# Patient Record
Sex: MALE | Race: BLACK OR AFRICAN AMERICAN | NOT HISPANIC OR LATINO | Employment: PART TIME | URBAN - METROPOLITAN AREA
[De-identification: names, ages, dates, MRNs, and addresses within clinical notes are randomized per-mention and may not be internally consistent; named-entity substitution may affect disease eponyms.]

---

## 2022-06-18 PROCEDURE — 99284 EMERGENCY DEPT VISIT MOD MDM: CPT | Mod: 25,27

## 2022-06-19 ENCOUNTER — HOSPITAL ENCOUNTER (EMERGENCY)
Facility: HOSPITAL | Age: 22
Discharge: HOME | End: 2022-06-19
Attending: FAMILY MEDICINE
Payer: MEDICAID

## 2022-06-19 VITALS
TEMPERATURE: 98 F | BODY MASS INDEX: 39.17 KG/M2 | RESPIRATION RATE: 17 BRPM | DIASTOLIC BLOOD PRESSURE: 73 MMHG | WEIGHT: 315 LBS | HEART RATE: 88 BPM | HEIGHT: 75 IN | OXYGEN SATURATION: 98 % | SYSTOLIC BLOOD PRESSURE: 128 MMHG
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
## 2022-06-19 DIAGNOSIS — T14.90XA TRAUMA: ICD-10-CM
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
## 2022-06-19 PROCEDURE — 25000003 PHARM REV CODE 250: Performed by: PHYSICIAN ASSISTANT

## 2022-06-19 RX ORDER — CYCLOBENZAPRINE HCL 10 MG
10 TABLET ORAL 3 TIMES DAILY PRN
Qty: 15 TABLET | Refills: 0 | Status: SHIPPED | OUTPATIENT
Start: 2022-06-19 | End: 2023-02-08

## 2022-06-19 RX ORDER — IBUPROFEN 400 MG/1
400 TABLET ORAL
Status: COMPLETED | OUTPATIENT
Start: 2022-06-19 | End: 2022-06-19

## 2022-06-19 RX ADMIN — IBUPROFEN 400 MG: 400 TABLET ORAL at 03:06
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
## 2022-06-19 NOTE — Clinical Note
"Rj Tang" Jaciel was seen and treated in our emergency department on 6/18/2022.  He may return to work on 06/22/2022.       If you have any questions or concerns, please don't hesitate to call.      Félix Rowe MD"

## 2022-06-20 NOTE — PROGRESS NOTES
Notified VITO Silva and BEENA Ibarra. Orthopedic referral placed in chart. No other orders received.

## 2022-08-01 ENCOUNTER — LAB VISIT (OUTPATIENT)
Dept: LAB | Facility: HOSPITAL | Age: 22
End: 2022-08-01
Attending: INTERNAL MEDICINE
Payer: MEDICAID

## 2022-08-01 DIAGNOSIS — Z11.3 SCREEN FOR STD (SEXUALLY TRANSMITTED DISEASE): ICD-10-CM

## 2022-08-01 DIAGNOSIS — E55.9 VITAMIN D DEFICIENCY: ICD-10-CM

## 2022-08-01 DIAGNOSIS — E78.5 HYPERLIPIDEMIA, UNSPECIFIED HYPERLIPIDEMIA TYPE: ICD-10-CM

## 2022-08-01 DIAGNOSIS — R73.03 PRE-DIABETES: ICD-10-CM

## 2022-08-01 DIAGNOSIS — E03.9 HYPOTHYROIDISM, UNSPECIFIED TYPE: ICD-10-CM

## 2022-08-01 DIAGNOSIS — D64.9 ANEMIA, UNSPECIFIED TYPE: Primary | ICD-10-CM

## 2022-08-01 LAB
ALBUMIN SERPL-MCNC: 4.1 GM/DL (ref 3.5–5)
ALBUMIN/GLOB SERPL: 1.2 RATIO (ref 1.1–2)
ALP SERPL-CCNC: 80 UNIT/L (ref 40–150)
ALT SERPL-CCNC: 32 UNIT/L (ref 0–55)
AST SERPL-CCNC: 19 UNIT/L (ref 5–34)
BILIRUBIN DIRECT+TOT PNL SERPL-MCNC: 0.7 MG/DL
BUN SERPL-MCNC: 9.1 MG/DL (ref 8.9–20.6)
C TRACH DNA SPEC QL NAA+PROBE: NOT DETECTED
CALCIUM SERPL-MCNC: 9.5 MG/DL (ref 8.4–10.2)
CHLORIDE SERPL-SCNC: 106 MMOL/L (ref 98–107)
CHOLEST SERPL-MCNC: 158 MG/DL
CHOLEST/HDLC SERPL: 6 {RATIO} (ref 0–5)
CO2 SERPL-SCNC: 24 MMOL/L (ref 22–29)
CREAT SERPL-MCNC: 1.05 MG/DL (ref 0.73–1.18)
DEPRECATED CALCIDIOL+CALCIFEROL SERPL-MC: 22.7 NG/ML (ref 30–80)
ERYTHROCYTE [DISTWIDTH] IN BLOOD BY AUTOMATED COUNT: 12.5 % (ref 11.5–17)
EST. AVERAGE GLUCOSE BLD GHB EST-MCNC: 114 MG/DL
GLOBULIN SER-MCNC: 3.3 GM/DL (ref 2.4–3.5)
GLUCOSE SERPL-MCNC: 93 MG/DL (ref 74–100)
HBA1C MFR BLD: 5.6 %
HCT VFR BLD AUTO: 44.9 % (ref 42–52)
HDLC SERPL-MCNC: 26 MG/DL (ref 35–60)
HGB BLD-MCNC: 14.7 GM/DL (ref 14–18)
HIV 1+2 AB+HIV1 P24 AG SERPL QL IA: NONREACTIVE
LDLC SERPL CALC-MCNC: 120 MG/DL (ref 50–140)
MCH RBC QN AUTO: 28.4 PG (ref 27–31)
MCHC RBC AUTO-ENTMCNC: 32.7 MG/DL (ref 33–36)
MCV RBC AUTO: 86.7 FL (ref 80–94)
N GONORRHOEA DNA SPEC QL NAA+PROBE: NOT DETECTED
NRBC BLD AUTO-RTO: 0 %
PLATELET # BLD AUTO: 310 X10(3)/MCL (ref 130–400)
PMV BLD AUTO: 10.4 FL (ref 7.4–10.4)
POTASSIUM SERPL-SCNC: 4.2 MMOL/L (ref 3.5–5.1)
PROT SERPL-MCNC: 7.4 GM/DL (ref 6.4–8.3)
RBC # BLD AUTO: 5.18 X10(6)/MCL (ref 4.7–6.1)
SODIUM SERPL-SCNC: 141 MMOL/L (ref 136–145)
T PALLIDUM AB SER QL: NONREACTIVE
T4 FREE SERPL-MCNC: 0.9 NG/DL (ref 0.7–1.48)
TRIGL SERPL-MCNC: 61 MG/DL (ref 34–140)
TSH SERPL-ACNC: 0.92 UIU/ML (ref 0.35–4.94)
VLDLC SERPL CALC-MCNC: 12 MG/DL
WBC # SPEC AUTO: 5.3 X10(3)/MCL (ref 4.5–11.5)

## 2022-08-01 PROCEDURE — 83036 HEMOGLOBIN GLYCOSYLATED A1C: CPT

## 2022-08-01 PROCEDURE — 85027 COMPLETE CBC AUTOMATED: CPT

## 2022-08-01 PROCEDURE — 82306 VITAMIN D 25 HYDROXY: CPT

## 2022-08-01 PROCEDURE — 84439 ASSAY OF FREE THYROXINE: CPT

## 2022-08-01 PROCEDURE — 80053 COMPREHEN METABOLIC PANEL: CPT

## 2022-08-01 PROCEDURE — 84443 ASSAY THYROID STIM HORMONE: CPT

## 2022-08-01 PROCEDURE — 80074 ACUTE HEPATITIS PANEL: CPT

## 2022-08-01 PROCEDURE — 86780 TREPONEMA PALLIDUM: CPT

## 2022-08-01 PROCEDURE — 87591 N.GONORRHOEAE DNA AMP PROB: CPT

## 2022-08-01 PROCEDURE — 80061 LIPID PANEL: CPT

## 2022-08-01 PROCEDURE — 36415 COLL VENOUS BLD VENIPUNCTURE: CPT

## 2022-08-01 PROCEDURE — 87389 HIV-1 AG W/HIV-1&-2 AB AG IA: CPT

## 2022-08-01 PROCEDURE — 87491 CHLMYD TRACH DNA AMP PROBE: CPT

## 2022-08-02 LAB
HAV IGM SERPL QL IA: NONREACTIVE
HBV CORE IGM SERPL QL IA: NONREACTIVE
HBV SURFACE AG SERPL QL IA: NONREACTIVE
HCV AB SERPL QL IA: NONREACTIVE

## 2022-08-03 LAB — PATH REV: NORMAL

## 2022-08-22 ENCOUNTER — OFFICE VISIT (OUTPATIENT)
Dept: URGENT CARE | Facility: CLINIC | Age: 22
End: 2022-08-22
Payer: MEDICAID

## 2022-08-22 VITALS
RESPIRATION RATE: 18 BRPM | BODY MASS INDEX: 38.36 KG/M2 | HEIGHT: 76 IN | HEART RATE: 81 BPM | OXYGEN SATURATION: 96 % | TEMPERATURE: 98 F | SYSTOLIC BLOOD PRESSURE: 142 MMHG | DIASTOLIC BLOOD PRESSURE: 82 MMHG | WEIGHT: 315 LBS

## 2022-08-22 DIAGNOSIS — Z11.52 ENCOUNTER FOR SCREENING FOR COVID-19: Primary | ICD-10-CM

## 2022-08-22 DIAGNOSIS — Z20.822 ENCOUNTER FOR LABORATORY TESTING FOR COVID-19 VIRUS: ICD-10-CM

## 2022-08-22 LAB — SARS-COV-2 RNA RESP QL NAA+PROBE: DETECTED

## 2022-08-22 PROCEDURE — 99214 OFFICE O/P EST MOD 30 MIN: CPT | Mod: PBBFAC | Performed by: FAMILY MEDICINE

## 2022-08-22 PROCEDURE — 99203 OFFICE O/P NEW LOW 30 MIN: CPT | Mod: S$PBB,,, | Performed by: FAMILY MEDICINE

## 2022-08-22 PROCEDURE — 99203 PR OFFICE/OUTPT VISIT, NEW, LEVL III, 30-44 MIN: ICD-10-PCS | Mod: S$PBB,,, | Performed by: FAMILY MEDICINE

## 2022-08-22 PROCEDURE — 87635 SARS-COV-2 COVID-19 AMP PRB: CPT | Performed by: FAMILY MEDICINE

## 2022-08-22 NOTE — LETTER
August 22, 2022      [facility]  [address] [address]  Phone: [phone]       Patient: Rj Grissom   YOB: 2000  Date of Visit: 08/22/2022    To Whom It May Concern:    Eran Grissom  was at [facility] on 08/22/2022. The patient may not return to work/school until results received  If you have any questions or concerns, or if I can be of further assistance, please do not hesitate to contact me.    Sincerely,    TIAGO HYDE MD

## 2022-08-23 ENCOUNTER — TELEPHONE (OUTPATIENT)
Dept: URGENT CARE | Facility: CLINIC | Age: 22
End: 2022-08-23
Payer: MEDICAID

## 2022-08-23 DIAGNOSIS — U07.1 COVID-19 VIRUS DETECTED: ICD-10-CM

## 2022-08-23 NOTE — PROGRESS NOTES
"Subjective:       Patient ID: Najwacoty Grissom is a 21 y.o. male.    Vitals:  height is 6' 3.59" (1.92 m) and weight is 143.7 kg (316 lb 12.8 oz) (abnormal). His temperature is 98.2 °F (36.8 °C). His blood pressure is 142/82 (abnormal) and his pulse is 81. His respiration is 18 and oxygen saturation is 96%.     Chief Complaint: COVID-19 Concerns (exposure)    HPI   Potential COVID exposure.  Asymptomatic  ROS    Constitutional: negative except as stated in HPI  Eye: negative except as stated in HPI  ENT: negative except as stated in HPI  Respiratory: negative except as stated in HPI  Cardiovascular: negative except as stated in HPI  Gastrointestinal: negative except as stated in HPI  Genitourinary: negative except as stated in HPI  Objective:      Physical Exam    VITAL SIGNS: Reviewed.  GENERAL: In no apparent distress  HEAD: No signs of head trauma.  EYES: Pupils are reactive. Extraocular motions intact.  CHEST: Clear breath sounds bilaterally.  CARDIAC: Regular.  Assessment:       1. Encounter for screening for COVID-19    2. Encounter for laboratory testing for COVID-19 virus          Plan:         Encounter for screening for COVID-19    Encounter for laboratory testing for COVID-19 virus  -     COVID-19 Routine Screening         Will notify with Covid test result. You may also see results on your patient portal. If result is negative, consider retesting in 5-7 days or get retested immediately if symptoms develop. Please follow Covid 19 precautions. Please read patient education material          "

## 2022-08-23 NOTE — TELEPHONE ENCOUNTER
----- Message from August Barney MD sent at 8/23/2022  8:42 AM CDT -----  Please notify patient of positive COVID test.  Review COVID-19 instructions

## 2022-08-29 ENCOUNTER — OFFICE VISIT (OUTPATIENT)
Dept: URGENT CARE | Facility: CLINIC | Age: 22
End: 2022-08-29
Payer: MEDICAID

## 2022-08-29 VITALS
WEIGHT: 315 LBS | SYSTOLIC BLOOD PRESSURE: 141 MMHG | HEIGHT: 74 IN | TEMPERATURE: 98 F | OXYGEN SATURATION: 97 % | RESPIRATION RATE: 20 BRPM | DIASTOLIC BLOOD PRESSURE: 81 MMHG | BODY MASS INDEX: 40.43 KG/M2 | HEART RATE: 73 BPM

## 2022-08-29 DIAGNOSIS — Z11.52 ENCOUNTER FOR SCREENING FOR COVID-19: Primary | ICD-10-CM

## 2022-08-29 LAB — SARS-COV-2 RNA RESP QL NAA+PROBE: NOT DETECTED

## 2022-08-29 PROCEDURE — 87635 SARS-COV-2 COVID-19 AMP PRB: CPT | Performed by: NURSE PRACTITIONER

## 2022-08-29 PROCEDURE — 99213 PR OFFICE/OUTPT VISIT, EST, LEVL III, 20-29 MIN: ICD-10-PCS | Mod: S$PBB,,, | Performed by: NURSE PRACTITIONER

## 2022-08-29 PROCEDURE — 99213 OFFICE O/P EST LOW 20 MIN: CPT | Mod: S$PBB,,, | Performed by: NURSE PRACTITIONER

## 2022-08-29 PROCEDURE — 99213 OFFICE O/P EST LOW 20 MIN: CPT | Mod: PBBFAC | Performed by: NURSE PRACTITIONER

## 2022-08-30 NOTE — PROGRESS NOTES
"Subjective:       Patient ID: Najwacoty Grissom is a 21 y.o. male.    Vitals:  height is 6' 2" (1.88 m) and weight is 144.7 kg (319 lb 1.6 oz) (abnormal). His temperature is 97.9 °F (36.6 °C). His blood pressure is 141/81 (abnormal) and his pulse is 73. His respiration is 20 and oxygen saturation is 97%.     Chief Complaint: COVID-19 Concerns (Retest )    Patient is a 21-year-old male, here today for COVID retest after testing positive on 08/22/2022.  Patient states he feels well, denies any symptoms today.      Constitution: Negative.   HENT: Negative.     Neck: neck negative.   Cardiovascular: Negative.    Respiratory: Negative.       Objective:      Physical Exam   Constitutional: He is oriented to person, place, and time. He appears well-developed.   HENT:   Head: Normocephalic.   Ears:   Right Ear: Tympanic membrane normal.   Left Ear: Tympanic membrane normal.   Nose: Nose normal.   Eyes: Conjunctivae and EOM are normal. Pupils are equal, round, and reactive to light.   Neck: Neck supple.   Cardiovascular: Normal rate, regular rhythm and normal heart sounds.   Pulmonary/Chest: Effort normal and breath sounds normal.   Musculoskeletal: Normal range of motion.         General: Normal range of motion.   Neurological: He is alert and oriented to person, place, and time.   Skin: Skin is warm and dry.   Psychiatric: His behavior is normal.   Vitals reviewed.      Assessment:       1. Encounter for screening for COVID-19              No visits with results within 1 Day(s) from this visit.   Latest known visit with results is:   Office Visit on 08/22/2022   Component Date Value Ref Range Status    SARS-CoV-2 PCR 08/22/2022 Detected (A)  Not Detected Final        No results found.   Plan:         COVID PCR pending, will notify of abnormal results.  If you have symptoms, recommend home quarantine for 5 days until improvement in symptoms and fever free for 24 hours.  If any difficulty breathing, increased wheezing, " shortness of breath or any new symptoms and immediately go to ER.      Encounter for screening for COVID-19  -     COVID-19 Routine Screening

## 2022-09-19 ENCOUNTER — HOSPITAL ENCOUNTER (EMERGENCY)
Facility: HOSPITAL | Age: 22
Discharge: HOME | End: 2022-09-20
Attending: FAMILY MEDICINE
Payer: MEDICAID

## 2022-09-19 VITALS
WEIGHT: 315 LBS | BODY MASS INDEX: 40.43 KG/M2 | DIASTOLIC BLOOD PRESSURE: 82 MMHG | HEIGHT: 74 IN | SYSTOLIC BLOOD PRESSURE: 130 MMHG | HEART RATE: 113 BPM | OXYGEN SATURATION: 96 % | TEMPERATURE: 99 F | RESPIRATION RATE: 18 BRPM

## 2022-09-19 DIAGNOSIS — R19.7 DIARRHEA, UNSPECIFIED TYPE: Primary | ICD-10-CM

## 2022-09-19 DIAGNOSIS — M54.50 ACUTE BILATERAL LOW BACK PAIN WITHOUT SCIATICA: ICD-10-CM

## 2022-09-19 PROCEDURE — 99284 EMERGENCY DEPT VISIT MOD MDM: CPT

## 2022-09-19 NOTE — Clinical Note
"Rj Tang" Jaciel was seen and treated in our emergency department on 9/19/2022.  He may return to work on 09/21/2022.       If you have any questions or concerns, please don't hesitate to call.      Ricky Trujillo MD"

## 2022-09-20 PROCEDURE — 63600175 PHARM REV CODE 636 W HCPCS: Performed by: FAMILY MEDICINE

## 2022-09-20 PROCEDURE — 96372 THER/PROPH/DIAG INJ SC/IM: CPT | Performed by: FAMILY MEDICINE

## 2022-09-20 RX ORDER — ONDANSETRON 4 MG/1
4 TABLET, ORALLY DISINTEGRATING ORAL EVERY 6 HOURS PRN
Qty: 10 TABLET | Refills: 0 | Status: SHIPPED | OUTPATIENT
Start: 2022-09-20 | End: 2022-09-25

## 2022-09-20 RX ORDER — DICYCLOMINE HYDROCHLORIDE 10 MG/1
10 CAPSULE ORAL EVERY 6 HOURS PRN
Qty: 20 CAPSULE | Refills: 0 | Status: SHIPPED | OUTPATIENT
Start: 2022-09-20 | End: 2022-09-30

## 2022-09-20 RX ORDER — IBUPROFEN 600 MG/1
600 TABLET ORAL EVERY 6 HOURS PRN
Qty: 40 TABLET | Refills: 0 | Status: SHIPPED | OUTPATIENT
Start: 2022-09-20 | End: 2022-09-30

## 2022-09-20 RX ORDER — KETOROLAC TROMETHAMINE 30 MG/ML
60 INJECTION, SOLUTION INTRAMUSCULAR; INTRAVENOUS
Status: COMPLETED | OUTPATIENT
Start: 2022-09-20 | End: 2022-09-20

## 2022-09-20 RX ADMIN — KETOROLAC TROMETHAMINE 60 MG: 30 INJECTION, SOLUTION INTRAMUSCULAR; INTRAVENOUS at 12:09

## 2022-09-20 NOTE — ED PROVIDER NOTES
Encounter Date: 9/19/2022       History     Chief Complaint   Patient presents with    Back Pain    Abdominal Pain    Fever     States fever, back pain and abdominal pain today.       21-year-old gentleman presents emergency room for evaluation due to abdominal pain which began today associated with abdominal cramping and 5 episodes of diarrhea.  Symptoms worsened around 2:00 p.m. (now yesterday, 09/19/2022) patient reports the abdominal cramping has resolved, but is now having some bilateral lower back pain.  He believes the back pain may be from laying in bed for a prolonged period of time.  Denies dysuria or hematuria.  Reports feeling his normal state of health on Sunday.  Patient currently requesting a work excuse for missing work yesterday.    The history is provided by the patient.   Review of patient's allergies indicates:  No Known Allergies  History reviewed. No pertinent past medical history.  History reviewed. No pertinent surgical history.  History reviewed. No pertinent family history.  Social History     Tobacco Use    Smoking status: Never    Smokeless tobacco: Never   Substance Use Topics    Alcohol use: Never    Drug use: Never     Review of Systems   Constitutional:  Negative for chills, fatigue and fever.   HENT:  Negative for ear pain, rhinorrhea and sore throat.    Eyes:  Negative for photophobia and pain.   Respiratory:  Negative for cough, shortness of breath and wheezing.    Cardiovascular:  Negative for chest pain.   Gastrointestinal:  Positive for abdominal pain and diarrhea. Negative for nausea and vomiting.   Genitourinary:  Negative for dysuria.   Musculoskeletal:  Positive for back pain. Negative for gait problem.   Neurological:  Negative for dizziness, weakness and headaches.   All other systems reviewed and are negative.    Physical Exam     Initial Vitals [09/19/22 2311]   BP Pulse Resp Temp SpO2   130/82 (!) 113 18 99.3 °F (37.4 °C) 96 %      MAP       --         Physical  Exam    Nursing note and vitals reviewed.  Constitutional: He appears well-developed and well-nourished.   HENT:   Head: Normocephalic and atraumatic.   Eyes: EOM are normal. Pupils are equal, round, and reactive to light.   Neck: Neck supple.   Normal range of motion.  Cardiovascular:  Normal rate, regular rhythm and normal heart sounds.     Exam reveals no gallop and no friction rub.       No murmur heard.  Pulmonary/Chest: Breath sounds normal. No respiratory distress.   Abdominal: Abdomen is soft. Bowel sounds are normal. He exhibits no distension. There is no abdominal tenderness.   Musculoskeletal:         General: Normal range of motion.      Cervical back: Normal range of motion and neck supple.     Neurological: He is alert and oriented to person, place, and time. He has normal strength.   Skin: Skin is warm and dry.   Psychiatric: He has a normal mood and affect. His behavior is normal. Judgment and thought content normal.       ED Course   Procedures  Labs Reviewed - No data to display       Imaging Results    None          Medications   ketorolac injection 60 mg (60 mg Intramuscular Given 9/20/22 0031)     Medical Decision Making:   Initial Assessment:   No abdominal tenderness to palpation.  No appreciable lower back pain.  Patient reports having 5 episodes of diarrhea yesterday.  Discussed with patient regarding obtain laboratory evaluation.  Does not desire this time.  Patient is nontoxic appearing.  Will give a Toradol injection helped lower back pain, and will treat symptomatically.  ER precautions given the patient for any acute worsening.                        Clinical Impression:   Final diagnoses:  [R19.7] Diarrhea, unspecified type (Primary)  [M54.50] Acute bilateral low back pain without sciatica      ED Disposition Condition    Discharge Stable          ED Prescriptions       Medication Sig Dispense Start Date End Date Auth. Provider    ondansetron (ZOFRAN-ODT) 4 MG TbDL Take 1 tablet (4 mg  total) by mouth every 6 (six) hours as needed (nausea vomiting). 10 tablet 9/20/2022 9/25/2022 Ricky Trujillo MD    dicyclomine (BENTYL) 10 MG capsule Take 1 capsule (10 mg total) by mouth every 6 (six) hours as needed (abdominal cramping). 20 capsule 9/20/2022 9/30/2022 Ricky Trujillo MD    ibuprofen (ADVIL,MOTRIN) 600 MG tablet Take 1 tablet (600 mg total) by mouth every 6 (six) hours as needed for Pain. 40 tablet 9/20/2022 9/30/2022 Ricky Trujillo MD          Follow-up Information       Follow up With Specialties Details Why Contact Info    Marietta Craig MD Internal Medicine   [address] [place] [id number] [phone]      [facility] Emergency Medicine  As needed, If symptoms worsen [address] [place] [address] [phone]    Primary Care Physician  In 5 days               Ricky Trujillo MD  09/20/22 0041

## 2022-11-07 ENCOUNTER — HOSPITAL ENCOUNTER (EMERGENCY)
Facility: HOSPITAL | Age: 22
Discharge: HOME | End: 2022-11-08
Attending: EMERGENCY MEDICINE
Payer: MEDICAID

## 2022-11-07 DIAGNOSIS — T50.Z95A HEADACHE AFTER VACCINATION: ICD-10-CM

## 2022-11-07 DIAGNOSIS — G44.40 HEADACHE AFTER VACCINATION: ICD-10-CM

## 2022-11-07 DIAGNOSIS — B34.9 VIRAL SYNDROME: Primary | ICD-10-CM

## 2022-11-07 LAB
FLUAV AG UPPER RESP QL IA.RAPID: NOT DETECTED
FLUBV AG UPPER RESP QL IA.RAPID: NOT DETECTED
SARS-COV-2 RNA RESP QL NAA+PROBE: NOT DETECTED

## 2022-11-07 PROCEDURE — 63600175 PHARM REV CODE 636 W HCPCS: Performed by: EMERGENCY MEDICINE

## 2022-11-07 PROCEDURE — 99284 EMERGENCY DEPT VISIT MOD MDM: CPT | Mod: 25

## 2022-11-07 PROCEDURE — 0241U COVID/FLU A&B PCR: CPT | Performed by: PHYSICIAN ASSISTANT

## 2022-11-07 PROCEDURE — 25000003 PHARM REV CODE 250: Performed by: EMERGENCY MEDICINE

## 2022-11-07 RX ORDER — PROCHLORPERAZINE MALEATE 10 MG
10 TABLET ORAL
Status: COMPLETED | OUTPATIENT
Start: 2022-11-07 | End: 2022-11-07

## 2022-11-07 RX ORDER — KETOROLAC TROMETHAMINE 10 MG/1
10 TABLET, FILM COATED ORAL
Status: COMPLETED | OUTPATIENT
Start: 2022-11-07 | End: 2022-11-07

## 2022-11-07 RX ADMIN — PROCHLORPERAZINE MALEATE 10 MG: 10 TABLET ORAL at 10:11

## 2022-11-07 RX ADMIN — KETOROLAC TROMETHAMINE 10 MG: 10 TABLET, FILM COATED ORAL at 10:11

## 2022-11-07 NOTE — Clinical Note
"Rj Tang" Jaciel was seen and treated in our emergency department on 11/7/2022.  He may return to work on 11/09/2022.       If you have any questions or concerns, please don't hesitate to call.      Shane Arevalo RN    "

## 2022-11-08 VITALS
HEART RATE: 81 BPM | HEIGHT: 75 IN | BODY MASS INDEX: 39.17 KG/M2 | TEMPERATURE: 98 F | RESPIRATION RATE: 16 BRPM | DIASTOLIC BLOOD PRESSURE: 69 MMHG | OXYGEN SATURATION: 97 % | SYSTOLIC BLOOD PRESSURE: 132 MMHG | WEIGHT: 315 LBS

## 2022-11-08 RX ORDER — KETOROLAC TROMETHAMINE 10 MG/1
10 TABLET, FILM COATED ORAL EVERY 6 HOURS
Qty: 20 TABLET | Refills: 0 | Status: SHIPPED | OUTPATIENT
Start: 2022-11-08 | End: 2022-11-13

## 2022-11-08 RX ORDER — PROCHLORPERAZINE MALEATE 10 MG
10 TABLET ORAL 4 TIMES DAILY PRN
Qty: 15 TABLET | Refills: 0 | Status: SHIPPED | OUTPATIENT
Start: 2022-11-08

## 2022-11-08 NOTE — ED PROVIDER NOTES
Encounter Date: 11/7/2022       History     Chief Complaint   Patient presents with    Chills    Headache     22-year-old male states he is having body aches, a headache, and mild nausea which began 2 days ago.  Yesterday he received his tuberculosis vaccine would seem to make his symptoms worse.  He has had no fevers chills sweats cough cold rhinorrhea sore throat nor vomiting.  He says his headache is frontal and was insidious in onset.  He is having no visual changes tingling numbness weakness nor other problems.    Review of patient's allergies indicates:  No Known Allergies  History reviewed. No pertinent past medical history.  History reviewed. No pertinent surgical history.  History reviewed. No pertinent family history.  Social History     Tobacco Use    Smoking status: Never    Smokeless tobacco: Never   Substance Use Topics    Alcohol use: Never    Drug use: Never     Review of Systems   Constitutional:  Negative for fever.   HENT:  Negative for sore throat.    Respiratory:  Negative for shortness of breath.    Cardiovascular:  Negative for chest pain.   Gastrointestinal:  Positive for nausea.   Genitourinary:  Negative for dysuria.   Musculoskeletal:  Negative for back pain.   Skin:  Negative for rash.   Neurological:  Positive for headaches. Negative for weakness.   Hematological:  Does not bruise/bleed easily.     Physical Exam     Initial Vitals [11/07/22 1958]   BP Pulse Resp Temp SpO2   130/72 (!) 135 20 (!) 100.6 °F (38.1 °C) 99 %      MAP       --         Physical Exam    Nursing note and vitals reviewed.  Constitutional: He appears well-developed.   HENT:   Head: Normocephalic.   Eyes: Conjunctivae are normal.   Cardiovascular:  Normal rate, regular rhythm and normal heart sounds.           Pulmonary/Chest: Breath sounds normal.   Abdominal: Abdomen is soft. Bowel sounds are normal. He exhibits no distension. There is no abdominal tenderness.   Musculoskeletal:         General: No edema.  "    Lymphadenopathy:     He has no cervical adenopathy.   Neurological: He is alert.   Skin: Skin is warm.   Psychiatric: He has a normal mood and affect.       ED Course   Procedures  Labs Reviewed   COVID/FLU A&B PCR - Normal    Narrative:     The Xpert Xpress SARS-CoV-2/FLU/RSV plus is a rapid, multiplexed real-time PCR test intended for the simultaneous qualitative detection and differentiation of SARS-CoV-2, Influenza A, Influenza B, and respiratory syncytial virus (RSV) viral RNA in either nasopharyngeal swab or nasal swab specimens.                Imaging Results    None          Medications   predniSONE tablet 60 mg (60 mg Oral Not Given 11/7/22 2215)   prochlorperazine tablet 10 mg (10 mg Oral Given 11/7/22 2231)   ketorolac tablet 10 mg (10 mg Oral Given 11/7/22 2231)     Medical Decision Making:   Differential Diagnosis:   Migraine headache, meningitis, aneurysm, traumatic brain injury, viral infection, medication reaction  ED Management:  Patient was re-evaluated at midnight stating he feels "way better" and would like a prescription for the same medications that we gave him in the ER.  He is ready to go home.                        Clinical Impression:   Final diagnoses:  [B34.9] Viral syndrome (Primary)  [G44.40, T50.Z95A] Headache after vaccination      ED Disposition Condition    Discharge Stable          ED Prescriptions       Medication Sig Dispense Start Date End Date Auth. Provider    prochlorperazine (COMPAZINE) 10 MG tablet Take 1 tablet (10 mg total) by mouth 4 (four) times daily as needed (headache/nausea). 15 tablet 11/8/2022 -- Juan M Harris Jr., MD    ketorolac (TORADOL) 10 mg tablet Take 1 tablet (10 mg total) by mouth every 6 (six) hours. for 5 days 20 tablet 11/8/2022 11/13/2022 Juan M Harris Jr., MD          Follow-up Information       Follow up With Specialties Details Why Contact Info    Marietta Craig MD Internal Medicine In 2 days  [address] [address]  [phone]               Juan M Harris Jr., MD  11/08/22 0003       Juan M Harris Jr., MD  11/08/22 0004